# Patient Record
Sex: MALE | Race: WHITE | URBAN - METROPOLITAN AREA
[De-identification: names, ages, dates, MRNs, and addresses within clinical notes are randomized per-mention and may not be internally consistent; named-entity substitution may affect disease eponyms.]

---

## 2018-02-19 ENCOUNTER — TRANSFERRED RECORDS (OUTPATIENT)
Dept: HEALTH INFORMATION MANAGEMENT | Facility: CLINIC | Age: 33
End: 2018-02-19

## 2018-03-28 ENCOUNTER — OFFICE VISIT (OUTPATIENT)
Dept: NEUROLOGY | Facility: CLINIC | Age: 33
End: 2018-03-28
Payer: COMMERCIAL

## 2018-03-28 VITALS
WEIGHT: 188.2 LBS | SYSTOLIC BLOOD PRESSURE: 126 MMHG | HEART RATE: 73 BPM | BODY MASS INDEX: 25.49 KG/M2 | TEMPERATURE: 89.3 F | DIASTOLIC BLOOD PRESSURE: 75 MMHG | HEIGHT: 72 IN

## 2018-03-28 DIAGNOSIS — G40.309 GENERALIZED CONVULSIVE EPILEPSY (H): Primary | ICD-10-CM

## 2018-03-28 RX ORDER — IBUPROFEN 800 MG/1
800 TABLET, FILM COATED ORAL
COMMUNITY
Start: 2018-02-26

## 2018-03-28 RX ORDER — CYCLOBENZAPRINE HCL 10 MG
5-10 TABLET ORAL
COMMUNITY
Start: 2018-02-26 | End: 2019-02-26

## 2018-03-28 RX ORDER — LEVETIRACETAM 500 MG/1
500 TABLET ORAL 2 TIMES DAILY
Qty: 60 TABLET | Refills: 3 | Status: SHIPPED | OUTPATIENT
Start: 2018-03-28 | End: 2018-07-11

## 2018-03-28 RX ORDER — LAMOTRIGINE 25 MG/1
TABLET ORAL
Qty: 250 TABLET | Refills: 3 | Status: SHIPPED | OUTPATIENT
Start: 2018-03-28 | End: 2018-10-10

## 2018-03-28 RX ORDER — LEVETIRACETAM 500 MG/1
500 TABLET ORAL
COMMUNITY
Start: 2018-02-19 | End: 2018-03-28

## 2018-03-28 ASSESSMENT — PAIN SCALES - GENERAL: PAINLEVEL: MODERATE PAIN (5)

## 2018-03-28 NOTE — PROGRESS NOTES
[facility] [place] [facility]    Patient:  Feliberto Perry  :  1985   Age:  33 year old   Today's Office Visit:  3/29/2018    Epilepsy Hisory:   The patient's first seizure occurred when he was 10 years old.  Second seizure at age 27, and third at age 33.     He has had 2 types of seizures:  Type 1:  GTC seizures: His first seizure was a grand mal seizure.  He was told that during GTC seizures, he would have a blank stare and then his eyes would roll back, and he would making noises like grunting sounds.  He would fall to the floor and shake all over, sometimes drool, sometimes bite his tongue.  Post-ictally, he is very tired and have sore muscles and would need to sleep.  He has had these very rarely; the last one was five years ago.  Type 2 are staring spells which subsided around age 16 or 18.  He tapered off carbamazepine in late 20s.  However, Dr. Walden's note states he was off carbamazepine around .  He took Tegretol when he was young, he started taking carbamazepine at age 10. He took carbamazepine from age [age]. He had no side effects from Tegretol, he was tired. He denies myoclonic jerking or other type of seizures.  He states that whenever he had a grand mal seizure, he was usually up late and drinking.  Excessive tiredness also triggers his seizures. He started levetiracetam 2018 and he is taking 500 mg twice a day. He denies history of meningitis, encephalitis, febrile seizures, family history of epilepsy, significant head injury, known tumor or stroke.     Interval History: This is my 1st visit with Feliberto. His wife, Rocio, is with him. He had a seizure on 2018 after a fishing trip, he was sleep deprived and had alcohol. Since starting levetiracetam he has no seizures. Currently, on antiepileptic drugs there is fatigue, no dizziness, no double vision , no mood changes (feelings of depression, irritablity, more argumentative), not sleeping more during the day, no GI  issues  , no rash  or no worsening anxeity . On this visit we spoke about patient's seizures, antiepileptic drug, and plan of epilepsy are. Patient/caregiver was agreeable with plan of care.     MEDICATIONS:  Levetiracetam 500 mg twice a day     Past antiepileptic drug:   He has been off Tegretol since 2014. He has not tried any other AEDs.      ALLERGIES TO MEDICATIONS:  Ancef.      PAST MEDICAL HISTORY:  Epilepsy.  Otherwise unremarkable.      SOCIAL AND EDUCATIONAL HISTORY:  He attended regular classes.  He did not need special education.  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  He is  and has two children.  He used to drink heavily, but has now cut back on his drinking, and maybe drinks once every few months, maximum of two beers.  Denies smoking or illicit drugs.      REVIEW OF SYSTEMS:  Complete review of system was done and was positive for nosebleed and eye floaters.      EXAMINATION /75 (BP Location: Left arm, Patient Position: Chair, Cuff Size: Adult Regular)  Pulse 73  Temp (!) 89.3  F (31.8  C) (Temporal)  Ht 6' (182.9 cm)  Wt 188 lb 3.2 oz (85.4 kg)  BMI 25.52 kg/m2  GENERAL:  Alert and oriented x3.   CARDIOVASCULAR:  Regular rate and rhythm, positive S1, S2.   LUNGS:  Clear to auscultation bilaterally.   ABDOMEN:  Nondistended, nontender.  Normal active bowel sounds.      NEUROLOGICAL EXAMINATION   Mental Status and Higher Cortical Functions:  Alert and oriented to person, place, and time.  Speech fluent, with intact naming and repetition. No dysarthria.  Cranial Nerves (II-XII):  Pupils equal, round, and reactive to light.  Extraocular movements full with no nystagmus.  Visual fields full to confrontation.  Facial sensation intact to light touch, temperature, and pin prick.  Face symmetric at rest and with activation.  Hearing intact to finger rub bilaterally.  Tongue  midline and palate elevation symmetric.  Sternocleidomastoid and trapezius 5/5 bilaterally.   Fundoscopic examination was unremarkable for pallor or edema bilaterally.    Motor:  Normal tone, normal bulk, and no pronator drift.  No tremors or fasciculations. Arm/hand circumduction was symmetric.  Motor strength 5/5 in upper and lower extremities.   Sensation:  Intact to light touch, vibration, and temperature.    Coordination:  Normal finger-nose-finger, fine finger movements, and rapid alternating movements.  No ataxia or dysmetria.     Reflexes:  Deep tendon reflexes 2+ and symmetric throughout.    Gait:  Casual gait and stance normal.         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  He had 3 generalized tonic-clonic convulsion in his life time in the setting of alcohol and sleep deprivation. Last seizure was 2/17/2018. He was started on levetiracetam in ER and he is not able to tolerate the side effects (fatigue). We will transition to lamotrigine. We reviewed the side effects, which include,  but are not limited to mayuri asif rash, hepatotoxicity, leukopenia, mood changes, nausea, cognitive changes, and dizziness.  He was agreeable to starting lamotrigine.     Plan:   Start lamotrigine   Follow up  3 months   Nurse education       Times of Days           Medication Tablet Size Number of Tablets/Capsules Total Daily Dosage   Lamotrigine        7 AM after breakfast  (Morning)   5 PM after dinner  (Night)        Week 1       25 mg  (1 tablet)      0 mg    Continue levetiracetam     Week 2       25 mg   (1 tablet)    25 mg   (1 tablet)   Continue levetiracetam     Week 3      50 mg   (2 tablets)   25 mg   (1 tablet)   Continue levetiracetam     Week 4      50 mg   (2 tablets)   50 mg   (2 tablets)   Continue levetiracetam     Week 5     75 mg   (3 tablets)   50 mg    (2 tablets)   Continue levetiracetam     Week 6      75 mg   (3 tablets)   75 mg   (3 tablets)   Continue levetiracetam    Week  7   100 mg   (4 tablets)  75 mg   (3 tablets)  Continue levetiracetam    Week 8   100 mg   (4 tablets)  100 mg   (4 tablets)  Continue levetiracetam    Week 9   125 mg   (5 tablets)  100 mg   (4 tablets)  Continue levetiracetam    Week 10   125 mg   (5 tablets)  125 mg  (5 tablets)   Continue levetiracetam    Week 11   150 mg   (6 tablets)  125 mg   (5 tablets)  Continue levetiracetam    Week 12   150 mg   (6 tablets)  150 mg   (6 tablets)  Continue levetiracetam      Monitor for side effects, especially rash and mood changes, if any concerns please call our office. [phone]    [place] regulations on driving were reviewed with you and you should not drive until you are three months seizure/spell free.You are prohibited from operating a motor vehicle within 3 months following any seizure or other episode with sudden unconsciousness or inability to sit up, and that it is required to report most recent seizure to the DMV within 30 days after the event.    Avoid any activities that might lead to self-injury or injury of others, within 3 months following any seizure with impaired awareness or impaired motor control such activities include but are not limited to operating power tools, operating firearms, climbing ladders/trees/exposure to heights from which he might fall. Do not operate power tools or heavy machinery and equipment.  Do not swim alone, bathe in any form of tub, such as bathtub, jacuzzi, or hot tub unless there is a responsible adult close by to provide assistance in case she has a seizure and drowns. Avoid work on hot surfaces such stoves, ovens, or with scalding hot water.         I spent 60 minutes with the patient. During this time counseling and coordination of care exceeded 50% of the face to face visit time. I addressed all questions the patient raised in regards to  their medical care.       Amelia Talbot MD

## 2018-03-28 NOTE — PATIENT INSTRUCTIONS
Times of Days           Medication Tablet Size Number of Tablets/Capsules Total Daily Dosage   Lamotrigine        7 AM after breakfast  (Morning)   5 PM after dinner  (Night)        Week 1       25 mg  (1 tablet)      0 mg    Continue levetiracetam     Week 2       25 mg   (1 tablet)    25 mg   (1 tablet)   Continue levetiracetam     Week 3      50 mg   (2 tablets)   25 mg   (1 tablet)   Continue levetiracetam     Week 4      50 mg   (2 tablets)   50 mg   (2 tablets)   Continue levetiracetam     Week 5     75 mg   (3 tablets)   50 mg   (2 tablets)   Continue levetiracetam     Week 6      75 mg   (3 tablets)   75 mg   (3 tablets)   Continue levetiracetam    Week  7   100 mg   (4 tablets)  75 mg   (3 tablets)  Continue levetiracetam    Week 8   100 mg   (4 tablets)  100 mg   (4 tablets)  Continue levetiracetam    Week 9   125 mg   (5 tablets)  100 mg   (4 tablets)  Continue levetiracetam    Week 10   125 mg   (5 tablets)  125 mg  (5 tablets)   Continue levetiracetam    Week 11   150 mg   (6 tablets)  125 mg   (5 tablets)  Continue levetiracetam    Week 12   150 mg   (6 tablets)  150 mg   (6 tablets)  Continue levetiracetam      Monitor for side effects, especially rash and mood changes, if any concerns please call our office. [phone]    [place] regulations on driving were reviewed with you and you should not drive until you are three months seizure/spell free.You are prohibited from operating a motor vehicle within 3 months following any seizure or other episode with sudden unconsciousness or inability to sit up, and that it is required to report most recent seizure to the DMV within 30 days after the event.    Avoid any activities that might lead to self-injury or injury of others, within 3 months following any seizure with impaired awareness or impaired motor control such activities include but are not limited to operating power tools, operating firearms, climbing ladders/trees/exposure to heights from  which he might fall. Do not operate power tools or heavy machinery and equipment.  Do not swim alone, bathe in any form of tub, such as bathtub, jacuzzi, or hot tub unless there is a responsible adult close by to provide assistance in case she has a seizure and drowns. Avoid work on hot surfaces such stoves, ovens, or with scalding hot water.       Amelia Talbot MD

## 2018-03-28 NOTE — LETTER
3/28/2018     RE: Feliberto Perry  : 1985   MRN: [id number]      Dear Colleague,    Thank you for referring your patient, Feliberto Perry, to the [facility] at [facility]. Please see a copy of my visit note below.    [facility] [place] [facility] Progress Note    Patient:  Feliberto Perry  :  1985   Age:  33 year old   Today's Office Visit:  3/29/2018    Epilepsy Hisory:   The patient's first seizure occurred when he was 10 years old.  Second seizure at age 27, and third at age 33.     He has had 2 types of seizures:  Type 1:  GTC seizures: His first seizure was a grand mal seizure.  He was told that during GTC seizures, he would have a blank stare and then his eyes would roll back, and he would making noises like grunting sounds.  He would fall to the floor and shake all over, sometimes drool, sometimes bite his tongue.  Post-ictally, he is very tired and have sore muscles and would need to sleep.  He has had these very rarely; the last one was five years ago.  Type 2 are staring spells which subsided around age 16 or 18.  He tapered off carbamazepine in late 20s.  However, Dr. Walden's note states he was off carbamazepine around .  He took Tegretol when he was young, he started taking carbamazepine at age 10. He took carbamazepine from age [age]. He had no side effects from Tegretol, he was tired. He denies myoclonic jerking or other type of seizures.  He states that whenever he had a grand mal seizure, he was usually up late and drinking.  Excessive tiredness also triggers his seizures. He started levetiracetam 2018 and he is taking 500 mg twice a day. He denies history of meningitis, encephalitis, febrile seizures, family history of epilepsy, significant head injury, known tumor or stroke.     Interval History: This is my 1st visit with Feliberto. His wife, Rocio, is with him. He had a seizure on 2018 after a fishing trip, he was sleep  deprived and had alcohol. Since starting levetiracetam he has no seizures. Currently, on antiepileptic drugs there is fatigue, no dizziness, no double vision , no mood changes (feelings of depression, irritablity, more argumentative), not sleeping more during the day, no GI issues  , no rash  or no worsening anxeity . On this visit we spoke about patient's seizures, antiepileptic drug, and plan of epilepsy are. Patient/caregiver was agreeable with plan of care.     MEDICATIONS:  Levetiracetam 500 mg twice a day     Past antiepileptic drug:   He has been off Tegretol since 2014. He has not tried any other AEDs.      ALLERGIES TO MEDICATIONS:  Ancef.      PAST MEDICAL HISTORY:  Epilepsy.  Otherwise unremarkable.      SOCIAL AND EDUCATIONAL HISTORY:  He attended regular classes.  He did not need special education.  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  He is  and has two children.  He used to drink heavily, but has now cut back on his drinking, and maybe drinks once every few months, maximum of two beers.  Denies smoking or illicit drugs.      REVIEW OF SYSTEMS:  Complete review of system was done and was positive for nosebleed and eye floaters.      EXAMINATION /75 (BP Location: Left arm, Patient Position: Chair, Cuff Size: Adult Regular)  Pulse 73  Temp (!) 89.3  F (31.8  C) (Temporal)  Ht 6' (182.9 cm)  Wt 188 lb 3.2 oz (85.4 kg)  BMI 25.52 kg/m2  GENERAL:  Alert and oriented x3.   CARDIOVASCULAR:  Regular rate and rhythm, positive S1, S2.   LUNGS:  Clear to auscultation bilaterally.   ABDOMEN:  Nondistended, nontender.  Normal active bowel sounds.      NEUROLOGICAL EXAMINATION   Mental Status and Higher Cortical Functions:  Alert and oriented to person, place, and time.  Speech fluent, with intact naming and repetition. No dysarthria.  Cranial Nerves (II-XII):  Pupils equal, round,  and reactive to light.  Extraocular movements full with no nystagmus.  Visual fields full to confrontation.  Facial sensation intact to light touch, temperature, and pin prick.  Face symmetric at rest and with activation.  Hearing intact to finger rub bilaterally.  Tongue midline and palate elevation symmetric.  Sternocleidomastoid and trapezius 5/5 bilaterally.   Fundoscopic examination was unremarkable for pallor or edema bilaterally.    Motor:  Normal tone, normal bulk, and no pronator drift.  No tremors or fasciculations. Arm/hand circumduction was symmetric.  Motor strength 5/5 in upper and lower extremities.   Sensation:  Intact to light touch, vibration, and temperature.    Coordination:  Normal finger-nose-finger, fine finger movements, and rapid alternating movements.  No ataxia or dysmetria.     Reflexes:  Deep tendon reflexes 2+ and symmetric throughout.    Gait:  Casual gait and stance normal.         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  He had 3 generalized tonic-clonic convulsion in his life time in the setting of alcohol and sleep deprivation. Last seizure was 2/17/2018. He was started on levetiracetam in ER and he is not able to tolerate the side effects (fatigue). We will transition to lamotrigine. We reviewed the side effects, which include,  but are not limited to mayuri asif rash, hepatotoxicity, leukopenia, mood changes, nausea, cognitive changes, and dizziness.  He was agreeable to starting lamotrigine.     Plan:   Start lamotrigine   Follow up  3 months   Nurse education           Times of Days           Medication Tablet Size Number of Tablets/Capsules Total Daily Dosage   Lamotrigine        7 AM after breakfast  (Morning)   5 PM after dinner  (Night)        Week 1       25 mg  (1 tablet)      0 mg    Continue levetiracetam     Week 2        25 mg   (1 tablet)    25 mg   (1 tablet)   Continue levetiracetam     Week 3      50 mg   (2 tablets)   25 mg   (1 tablet)   Continue levetiracetam     Week 4      50 mg   (2 tablets)   50 mg   (2 tablets)   Continue levetiracetam     Week 5     75 mg   (3 tablets)   50 mg   (2 tablets)   Continue levetiracetam     Week 6      75 mg   (3 tablets)   75 mg   (3 tablets)   Continue levetiracetam    Week  7   100 mg   (4 tablets)  75 mg   (3 tablets)  Continue levetiracetam    Week 8   100 mg   (4 tablets)  100 mg   (4 tablets)  Continue levetiracetam    Week 9   125 mg   (5 tablets)  100 mg   (4 tablets)  Continue levetiracetam    Week 10   125 mg   (5 tablets)  125 mg  (5 tablets)   Continue levetiracetam    Week 11   150 mg   (6 tablets)  125 mg   (5 tablets)  Continue levetiracetam    Week 12   150 mg   (6 tablets)  150 mg   (6 tablets)  Continue levetiracetam      Monitor for side effects, especially rash and mood changes, if any concerns please call our office. [phone]    [place] regulations on driving were reviewed with you and you should not drive until you are three months seizure/spell free.You are prohibited from operating a motor vehicle within 3 months following any seizure or other episode with sudden unconsciousness or inability to sit up, and that it is required to report most recent seizure to the DMV within 30 days after the event.    Avoid any activities that might lead to self-injury or injury of others, within 3 months following any seizure with impaired awareness or impaired motor control such activities include but are not limited to operating power tools, operating firearms, climbing ladders/trees/exposure to heights from which he might fall. Do not operate power tools or heavy machinery and equipment.  Do not swim alone, bathe in any form of tub, such as bathtub, jacuzzi, or hot tub unless there is a responsible adult close by to provide assistance in case she has a seizure and drowns.  Avoid work on hot surfaces such stoves, ovens, or with scalding hot water.     I spent 60 minutes with the patient. During this time counseling and coordination of care exceeded 50% of the face to face visit time. I addressed all questions the patient raised in regards to their medical care.     Again, thank you for allowing me to participate in the care of your patient.      Sincerely,    Amelia Talbot MD

## 2018-03-28 NOTE — MR AVS SNAPSHOT
After Visit Summary   3/28/2018    Feliberto Perry    MRN: [id number]           Patient Information     Date Of Birth          1985        Visit Information        Provider Department      3/28/2018 1:00 PM Amelia Talbot MD [facility]        Today's Diagnoses     Generalized convulsive epilepsy (H)    -  1      Care Instructions      Times of Days           Medication Tablet Size Number of Tablets/Capsules Total Daily Dosage   Lamotrigine        7 AM after breakfast  (Morning)   5 PM after dinner  (Night)        Week 1       25 mg  (1 tablet)      0 mg    Continue levetiracetam     Week 2       25 mg   (1 tablet)    25 mg   (1 tablet)   Continue levetiracetam     Week 3      50 mg   (2 tablets)   25 mg   (1 tablet)   Continue levetiracetam     Week 4      50 mg   (2 tablets)   50 mg   (2 tablets)   Continue levetiracetam     Week 5     75 mg   (3 tablets)   50 mg   (2 tablets)   Continue levetiracetam     Week 6      75 mg   (3 tablets)   75 mg   (3 tablets)   Continue levetiracetam    Week  7   100 mg   (4 tablets)  75 mg   (3 tablets)  Continue levetiracetam    Week 8   100 mg   (4 tablets)  100 mg   (4 tablets)  Continue levetiracetam    Week 9   125 mg   (5 tablets)  100 mg   (4 tablets)  Continue levetiracetam    Week 10   125 mg   (5 tablets)  125 mg  (5 tablets)   Continue levetiracetam    Week 11   150 mg   (6 tablets)  125 mg   (5 tablets)  Continue levetiracetam    Week 12   150 mg   (6 tablets)  150 mg   (6 tablets)  Continue levetiracetam      Monitor for side effects, especially rash and mood changes, if any concerns please call our office. [phone]    [place] regulations on driving were reviewed with you and you should not drive until you are three months seizure/spell free.You are prohibited from operating a motor vehicle within 3 months following any seizure or other episode with sudden unconsciousness or inability to sit up, and that it is required to report most  recent seizure to the DMV within 30 days after the event.    Avoid any activities that might lead to self-injury or injury of others, within 3 months following any seizure with impaired awareness or impaired motor control such activities include but are not limited to operating power tools, operating firearms, climbing ladders/trees/exposure to heights from which he might fall. Do not operate power tools or heavy machinery and equipment.  Do not swim alone, bathe in any form of tub, such as bathtub, jacuzzi, or hot tub unless there is a responsible adult close by to provide assistance in case she has a seizure and drowns. Avoid work on hot surfaces such stoves, ovens, or with scalding hot water.       Amelia Talbot MD             Follow-ups after your visit        Follow-up notes from your care team     Return in about 3 months (around 6/28/2018).      Your next 10 appointments already scheduled     Jul 11, 2018  3:00 PM CDT   Return Visit with Amelia Tablot MD   [facility]    [address] [place] [address], [address]  [facility] [address] [phone]              Who to contact     Please call your clinic at [phone] to:    Ask questions about your health    Make or cancel appointments    Discuss your medicines    Learn about your test results    Speak to your doctor            Additional Information About Your Visit        MyChart Information     [employer] is an electronic gateway that provides easy, online access to your medical records. With Higher[employer], you can request a clinic appointment, read your test results, renew a prescription or communicate with your care team.     To sign up for [employer] visit the website at [email or web address]   You will be asked to enter the access code listed below, as well as some personal information. Please follow the directions to create your username and password.     Your access code is: [email or web address]  Expires: 6/26/2018  2:12 PM      Your access code will  in 90 days. If you need help or a new code, please contact your [facility] or call [phone] for assistance.        Care EveryWhere ID     This is your Care EveryWhere ID. This could be used by other organizations to access your [place] medical records  [id number]        Your Vitals Were     Pulse Temperature Height BMI (Body Mass Index)          73 89.3  F (31.8  C) (Temporal) 6' (182.9 cm) 25.52 kg/m2         Blood Pressure from Last 3 Encounters:   18 126/75   16 101/65    Weight from Last 3 Encounters:   18 188 lb 3.2 oz (85.4 kg)   16 180 lb (81.6 kg)              We Performed the Following     Comprehensive metabolic panel     Lamotrigine Level          Today's Medication Changes          These changes are accurate as of 3/28/18  2:12 PM.  If you have any questions, ask your nurse or doctor.               Start taking these medicines.        Dose/Directions    lamoTRIgine 25 MG tablet   Commonly known as:  LaMICtal   Used for:  Generalized convulsive epilepsy (H)   Started by:  Amelia Talbot MD        Titrate to 150 mg twice a day   Quantity:  250 tablet   Refills:  3         These medicines have changed or have updated prescriptions.        Dose/Directions    levETIRAcetam 500 MG tablet   Commonly known as:  KEPPRA   This may have changed:  when to take this   Used for:  Generalized convulsive epilepsy (H)   Changed by:  Amelia Talbot MD        Dose:  500 mg   Take 1 tablet (500 mg) by mouth 2 times daily   Quantity:  60 tablet   Refills:  3            Where to get your medicines      These medications were sent to [id number] - [place], [place] - [address]  [address] [address]     Phone:  [phone]     lamoTRIgine 25 MG tablet    levETIRAcetam 500 MG tablet                Primary Care Provider Office Phone # Fax #    Kevin Walden [phone]       [facility]  [address]  [place] [address]        Equal Access to Services     RICARDOHEATHER JAMES : Hadii john jacques louiekaushal Sotrudyali, waaxda luqadaha, qaybta kaedisonda dankdavina, mela johnson diazkylee weemssylvester fuentes ellen arevalo. So [facility] [phone].    ATENCIÓN: Si habla español, tiene a narayan disposición servicios gratuitos de asistencia lingüística. Llame al [phone].    We comply with applicable federal civil rights laws and [place] laws. We do not discriminate on the basis of race, color, national origin, age, disability, sex, sexual orientation, or gender identity.            Thank you!     Thank you for choosing [facility]  for your care. Our goal is always to provide you with excellent care. Hearing back from our patients is one way we can continue to improve our services. Please take a few minutes to complete the written survey that you may receive in the mail after your visit with us. Thank you!             Your Updated Medication List - Protect others around you: Learn how to safely use, store and throw away your medicines at www.disposemymeds.org.          This list is accurate as of 3/28/18  2:12 PM.  Always use your most recent med list.                   Brand Name Dispense Instructions for use Diagnosis    cyclobenzaprine 10 MG tablet    FLEXERIL     Take 5-10 mg by mouth    Generalized convulsive epilepsy (H)       ibuprofen 800 MG tablet    ADVIL/MOTRIN     Take 800 mg by mouth    Generalized convulsive epilepsy (H)       lamoTRIgine 25 MG tablet    LaMICtal    250 tablet    Titrate to 150 mg twice a day    Generalized convulsive epilepsy (H)       levETIRAcetam 500 MG tablet    KEPPRA    60 tablet    Take 1 tablet (500 mg) by mouth 2 times daily    Generalized convulsive epilepsy (H)

## 2018-06-25 ENCOUNTER — TRANSFERRED RECORDS (OUTPATIENT)
Dept: HEALTH INFORMATION MANAGEMENT | Facility: CLINIC | Age: 33
End: 2018-06-25

## 2018-06-27 LAB
ALBUMIN SERPL-MCNC: 4.2 G/DL (ref 3.5–5)
ALP SERPL-CCNC: 65 U/L (ref 40–150)
ALT SERPL-CCNC: 18 U/L (ref 0–55)
ANION GAP SERPL CALCULATED.3IONS-SCNC: 10.7 MMOL/L
AST SERPL-CCNC: 23 U/L (ref 5–34)
BILIRUB SERPL-MCNC: 0.7 MG/DL (ref 0.2–1.2)
BUN SERPL-MCNC: 11 MG/DL (ref 7–26)
CALCIUM SERPL-MCNC: 9.8 MG/DL (ref 8.4–10.2)
CHLORIDE SERPLBLD-SCNC: 106 MMOL/L (ref 98–107)
CO2 SERPL-SCNC: 30 MMOL/L (ref 20–31)
CREAT SERPL-MCNC: 1 MG/DL (ref 0.73–1.18)
GFR SERPL CREATININE-BSD FRML MDRD: 85 ML/MIN/1.73M2
GLUCOSE SERPL-MCNC: 85 MG/DL (ref 70–105)
LAMOTRIGINE SERPL-MCNC: 4.5 UG/ML (ref 2.5–15)
OSMOLALITY: 293 MMOL/KG (ref 275–295)
POTASSIUM SERPL-SCNC: 4.7 MMOL/L (ref 3.5–5.1)
PROT SERPL-MCNC: 7.1 G/DL (ref 6.4–8.3)
SODIUM SERPL-SCNC: 142 MMOL/L (ref 136–145)

## 2018-06-28 DIAGNOSIS — G40.309 GENERALIZED CONVULSIVE EPILEPSY (H): ICD-10-CM

## 2018-07-11 ENCOUNTER — OFFICE VISIT (OUTPATIENT)
Dept: NEUROLOGY | Facility: CLINIC | Age: 33
End: 2018-07-11
Payer: COMMERCIAL

## 2018-07-11 VITALS
RESPIRATION RATE: 16 BRPM | BODY MASS INDEX: 24.28 KG/M2 | HEART RATE: 82 BPM | SYSTOLIC BLOOD PRESSURE: 115 MMHG | WEIGHT: 179 LBS | TEMPERATURE: 97.4 F | DIASTOLIC BLOOD PRESSURE: 65 MMHG

## 2018-07-11 DIAGNOSIS — G40.309 GENERALIZED CONVULSIVE EPILEPSY (H): ICD-10-CM

## 2018-07-11 RX ORDER — LEVETIRACETAM 250 MG/1
TABLET ORAL
Qty: 360 TABLET | Refills: 3 | Status: SHIPPED | OUTPATIENT
Start: 2018-07-11 | End: 2018-10-10

## 2018-07-11 RX ORDER — LAMOTRIGINE 100 MG/1
TABLET ORAL
Qty: 405 TABLET | Refills: 3 | Status: SHIPPED | OUTPATIENT
Start: 2018-07-11 | End: 2018-10-10

## 2018-07-11 ASSESSMENT — PAIN SCALES - GENERAL: PAINLEVEL: NO PAIN (0)

## 2018-07-11 NOTE — PROGRESS NOTES
[facility]    Patient:  Feliberto Perry  :  1985   Age:  33 year old   Today's Office Visit:  2018    Epilepsy Data:  The patient's first seizure occurred when he was 10 years old.  Second seizure at age 27, and third at age 33. He had a 4th seizure 2018, he missed a one antiepileptic drug dose. The had his 5th seizure 2018,    He has had 2 types of seizures:  Type 1:  GTC seizures: His first seizure was a grand mal seizure.  He was told that during GTC seizures, he would have a blank stare and then his eyes would roll back, and he would making noises like grunting sounds.  He would fall to the floor and shake all over, sometimes drool, sometimes bite his tongue.  Post-ictally, he is very tired and have sore muscles and would need to sleep.  He has had these very rarely; the last one was five years ago.  Type 2 are staring spells which subsided around age 16 or 18.  He tapered off carbamazepine in late 20s.  However, Dr. Walden's note states he was off carbamazepine around .  He took Tegretol when he was young, he started taking carbamazepine at age 10. He took carbamazepine from age [age]. He had no side effects from Tegretol, he was tired. He denies myoclonic jerking or other type of seizures.  He states that whenever he had a grand mal seizure, he was usually up late and drinking.  Excessive tiredness also triggers his seizures. He started levetiracetam 2018 and he is taking 500 mg twice a day. He denies history of meningitis, encephalitis, febrile seizures, family history of epilepsy, significant head injury, known tumor or stroke.     Interval History: This is my 2nd visit with Feliberto. His wife, Rocio, is with him. He had a seizure 2018, he missed a one antiepileptic drug dose. The had his 5th seizure 2018, he did not miss antiepileptic drug, no alcohol. On lamotrigine no major side effects. Most seizure happen at 8:30am. Its not clear why  seizure have increased. He does not have MRI brain on file, we will order one.  Currently, on antiepileptic drugs there is fatigue, no dizziness, no double vision , no mood changes (feelings of depression, irritablity, more argumentative), not sleeping more during the day, no GI issues  , no rash  or no worsening anxeity . On this visit we spoke about patient's seizures, antiepileptic drug, and plan of epilepsy are. Patient/caregiver was agreeable with plan of care.     Prior to Admission medications    Medication Sig Start Date End Date Taking? Authorizing Provider   lamoTRIgine (LAMICTAL) 100 MG tablet Titrate to 350 mg am and 150 mg pm 7/11/18  Yes Amelia Talbot MD   lamoTRIgine (LAMICTAL) 25 MG tablet Titrate to 150 mg twice a day 3/28/18  Yes Amelia Talbot MD   levETIRAcetam (KEPPRA) 250 MG tablet 500 mg twice a day, then change to 250 mg am and 750 mg pm 7/11/18  Yes Amelia Talbot MD   cyclobenzaprine (FLEXERIL) 10 MG tablet Take 5-10 mg by mouth 2/26/18 2/26/19  Reported, Patient   ibuprofen (ADVIL/MOTRIN) 800 MG tablet Take 800 mg by mouth 2/26/18   Reported, Patient         MEDICATIONS:  Levetiracetam 500 mg twice a day and lamotrigine 150 mg twice a day     Past antiepileptic drug:   He has been off Tegretol since 2014. He has not tried any other AEDs.      ALLERGIES TO MEDICATIONS:  Ancef.      PAST MEDICAL HISTORY:  Epilepsy.  Otherwise unremarkable.      SOCIAL AND EDUCATIONAL HISTORY:  He attended regular classes.  He did not need special education.  He had [employer] education courses in construction/labor. 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  He is  and has two children.  He used to drink heavily, but has now cut back on his drinking, and maybe drinks once every few months, maximum of two beers.  Denies smoking or illicit drugs.      REVIEW OF SYSTEMS:  Complete review of system was done and was positive for nosebleed and eye  floaters.      EXAMINATION /65 (BP Location: Right arm, Patient Position: Chair, Cuff Size: Adult Regular)  Pulse 82  Temp 97.4  F (36.3  C)  Resp 16  Wt 179 lb (81.2 kg)  BMI 24.28 kg/m2  Alert, orientated, speech is fluent, pupils are equal, round, and reactive to light, face symmetric, no pronator drip, equal  strength, reflexes are symmetric, normal to light touch with no sensory deficits noted, finger to nose normal, no focal deficits noted.Gait is stable. Able to tandem gait.      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  He had 5 generalized tonic-clonic convulsions, last seizure was June 2018 on levetiracetam 500-500 and lamotrigine 150-150. Lamotrigine level was 4.7. We will optimize lamotrigine and shift levetiracetam to evening reduce fatigue side effects in the morning. We reviewed the side effects, which include,  but are not limited to mayuri asif rash, hepatotoxicity, leukopenia, mood changes, nausea, cognitive changes, and dizziness.  He was agreeable to increasing lamotrigine.     Other antiepileptic drug to consider are: depakote, zonisamide, topiramate, vimpat, fycompa.     Plan:   Follow up  2 months   Nurse education                Medication Name   Tablet Size         8AM  (morning)   8PM (Night)   Notes    Week 1   lamotrigine 100 mg  1.5 tablet    1.5 tablet         lamotrigine 25 mg   1 tablet    0 tablet         levetiracetam 250 mg  2 tablet    2 tablet    levetiracetam pill size changed                  Medication Name   Tablet Size         8AM  (morning)   8PM (Night)   Notes    Week 2   lamotrigine 100 mg  2 tablet    1.5 tablet         lamotrigine 25 mg   0 tablet    0 tablet         levetiracetam 250 mg  2 tablet    2 tablet                     Medication Name   Tablet Size         8AM  (morning)   8PM (Night)   Notes     Week 3-4   lamotrigine 100 mg  2.5 tablet    1.5 tablet         lamotrigine 25 mg   0 tablet    0 tablet         levetiracetam 250 mg  2 tablet    2 tablet                     Medication Name   Tablet Size         8 AM  (morning)   8 PM (Night)   Notes    Week 5--6   lamotrigine 100 mg  3.5 tablet    1 tablet   check antiepileptic drug levels       lamotrigine 25 mg   0 tablet    0 tablet         levetiracetam 250 mg  2 tablet    2 tablet                   Medication Name   Tablet Size         8 AM  (morning)   8 PM (Night)   Notes    Week 7-onward   lamotrigine 100 mg  3.5 tablet    1 tablet   check antiepileptic drug levels       lamotrigine 25 mg   0 tablet    0 tablet         levetiracetam 250 mg  1 tablet    3 tablet  Levetiracetam pill change             [place] regulations on driving were reviewed with you and you should not drive until you are three months seizure/spell free.You are prohibited from operating a motor vehicle within 3 months following any seizure or other episode with sudden unconsciousness or inability to sit up, and that it is required to report most recent seizure to the DMV within 30 days after the event.    Avoid any activities that might lead to self-injury or injury of others, within 3 months following any seizure with impaired awareness or impaired motor control such activities include but are not limited to operating power tools, operating firearms, climbing ladders/trees/exposure to heights from which he might fall. Do not operate power tools or heavy machinery and equipment.  Do not swim alone, bathe in any form of tub, such as bathtub, jacuzzi, or hot tub unless there is a responsible adult close by to provide assistance in case she has a seizure and drowns. Avoid work on hot surfaces such stoves, ovens, or with scalding hot water.         I spent 40 minutes with the patient. During this time counseling and coordination of care exceeded 50% of the face to face visit time. I  addressed all questions the patient raised in regards to their medical care.       Amelia Talbot MD

## 2018-07-11 NOTE — MR AVS SNAPSHOT
After Visit Summary   7/11/2018    Feliberto Perry    MRN: [id number]           Patient Information     Date Of Birth          1985        Visit Information        Provider Department      7/11/2018 3:00 PM Amelia Talbot MD [facility]        Today's Diagnoses     Generalized convulsive epilepsy (H)          Care Instructions                   Medication Name   Tablet Size         8AM  (morning)   8PM (Night)   Notes    Week 1   lamotrigine 100 mg  1.5 tablet    1.5 tablet         lamotrigine 25 mg   1 tablet    0 tablet         levetiracetam 250 mg  2 tablet    2 tablet    levetiracetam pill size changed                  Medication Name   Tablet Size         8AM  (morning)   8PM (Night)   Notes    Week 2   lamotrigine 100 mg  2 tablet    1.5 tablet         lamotrigine 25 mg   0 tablet    0 tablet         levetiracetam 250 mg  2 tablet    2 tablet                     Medication Name   Tablet Size         8AM  (morning)   8PM (Night)   Notes    Week 3-4   lamotrigine 100 mg  2.5 tablet    1.5 tablet         lamotrigine 25 mg   0 tablet    0 tablet         levetiracetam 250 mg  2 tablet    2 tablet                     Medication Name   Tablet Size         8 AM  (morning)   8 PM (Night)   Notes    Week 5--6   lamotrigine 100 mg  3.5 tablet    1 tablet   check antiepileptic drug levels       lamotrigine 25 mg   0 tablet    0 tablet         levetiracetam 250 mg  2 tablet    2 tablet                   Medication Name   Tablet Size         8 AM  (morning)   8 PM (Night)   Notes    Week 7-onward   lamotrigine 100 mg  3.5 tablet    1 tablet   check antiepileptic drug levels       lamotrigine 25 mg   0 tablet    0 tablet         levetiracetam 250 mg  1 tablet    3 tablet  Levetiracetam pill change           Take antiepileptic drug after breakfast to reduce side effects.  If lamotrigine 350 mg am is too much and causes side effects, then change to 300 mg am and 150 mg pm    MRI brain   Nurse  education on generalized tonic-clonic convulsion   Follow up  In 6-12 weeks  [place] regulations on driving were reviewed with you and you should not drive until you are three months seizure/spell free.You are prohibited from operating a motor vehicle within 3 months following any seizure or other episode with sudden unconsciousness or inability to sit up, and that it is required to report most recent seizure to the DMV within 30 days after the event.    Avoid any activities that might lead to self-injury or injury of others, within 3 months following any seizure with impaired awareness or impaired motor control such activities include but are not limited to operating power tools, operating firearms, climbing ladders/trees/exposure to heights from which he might fall. Do not operate power tools or heavy machinery and equipment.  Do not swim alone, bathe in any form of tub, such as bathtub, jacuzzi, or hot tub unless there is a responsible adult close by to provide assistance in case she has a seizure and drowns. Avoid work on hot surfaces such stoves, ovens, or with scalding hot water.         CONTINUE TAKING YOUR OTHER MEDICATIONS AS PREVIOUSLY DIRECTED.  IF YOU  HAVE ANY SIDE EFFECTS OR CONCERNS ABOUT YOUR ANTIEPILEPTIC DRUG CALL [facility] OFFICE [phone]. PLEASE FOLLOW MEDICATION CHANGES AS ADVISED.     This titration schedule was revive wed with patient or caregiver. They expressed understanding of these antiepileptic drug changes.     HALINA DOYLE MD               Follow-ups after your visit        Additional Services     [facility] Patient Education Referral                 Follow-up notes from your care team     Return in about 3 months (around 10/11/2018).      Your next 10 appointments already scheduled     Jul 25, 2018  3:30 PM CDT   Telephone Call with [facility] Nurse 1   [facility] ([facility]    [address], [address]  [facility] [id number] [phone]            Note: this is not an onsite visit; there is no need to come to the facility.            Oct 10, 2018  3:00 PM CDT   Return Visit with Amelia Talbot MD   [facility] ([facility]    [address], [address]  [facility] [id number] [phone]              Future tests that were ordered for you today     Open Standing Orders        Priority Remaining Interval Expires Ordered    Lamotrigine Level Routine 4/4 1/11/2019 7/11/2018    Keppra (Levetiracetam) Level Routine 4/4 1/11/2019 7/11/2018          Open Future Orders        Priority Expected Expires Ordered    Comprehensive metabolic panel Routine 8/11/2018 12/11/2018 7/11/2018    MR Brain w/o & w Contrast Routine  7/11/2019 7/11/2018            Who to contact     Please call your clinic at [phone] to:    Ask questions about your health    Make or cancel appointments    Discuss your medicines    Learn about your test results    Speak to your doctor            Additional Information About Your Visit        Care EveryWhere ID     This is your Care EveryWhere ID. This could be used by other organizations to access your [place] medical records  [id number]        Your Vitals Were     Pulse Temperature Respirations BMI (Body Mass Index)          82 97.4  F (36.3  C) 16 24.28 kg/m2         Blood Pressure from Last 3 Encounters:   07/11/18 115/65   03/28/18 126/75   05/26/16 101/65    Weight from Last 3 Encounters:   07/11/18 179 lb (81.2 kg)   03/28/18 188 lb 3.2 oz (85.4 kg)   05/26/16 180 lb (81.6 kg)              We Performed the Following     [facility] Patient Education Referral          Today's Medication Changes          These changes are accurate as of 7/11/18  3:27 PM.  If you have any questions, ask your nurse or doctor.               These medicines have changed or have updated prescriptions.        Dose/Directions    * lamoTRIgine 25 MG tablet   Commonly known as:  LaMICtal   This may have changed:  Another medication with  the same name was added. Make sure you understand how and when to take each.   Used for:  Generalized convulsive epilepsy (H)   Changed by:  Amelia Talbot MD        Titrate to 150 mg twice a day   Quantity:  250 tablet   Refills:  3       * lamoTRIgine 100 MG tablet   Commonly known as:  LaMICtal   This may have changed:  You were already taking a medication with the same name, and this prescription was added. Make sure you understand how and when to take each.   Used for:  Generalized convulsive epilepsy (H)   Changed by:  Amelia Talbot MD        Titrate to 350 mg am and 150 mg pm   Quantity:  405 tablet   Refills:  3       levETIRAcetam 250 MG tablet   Commonly known as:  KEPPRA   This may have changed:    - medication strength  - how much to take  - how to take this  - when to take this  - additional instructions   Used for:  Generalized convulsive epilepsy (H)   Changed by:  Amelia Talbot MD        500 mg twice a day, then change to 250 mg am and 750 mg pm   Quantity:  360 tablet   Refills:  3       * Notice:  This list has 2 medication(s) that are the same as other medications prescribed for you. Read the directions carefully, and ask your doctor or other care provider to review them with you.         Where to get your medicines      These medications were sent to [id number] - [place] - [address]  [address] [facility] [address]     Phone:  [phone]     lamoTRIgine 100 MG tablet    levETIRAcetam 250 MG tablet                Primary Care Provider Office Phone # Fax #    Kevin Walden [phone]       [facility] [address]  [place] [address]        Equal Access to Services     [facility] AH: Hadii john jacques hadasho Soomaali, waaxda luqadaha, qaybta kaalmada adedavina, mela arevalo. So [facility] [place] [phone].    ATENCIÓN: Si habla español, tiene a narayan disposición servicios gratuitos de asistencia lingüística. Llame al  [phone].    We comply with applicable federal civil rights laws and [place] laws. We do not discriminate on the basis of race, color, national origin, age, disability, sex, sexual orientation, or gender identity.            Thank you!     Thank you for choosing [facility]  for your care. Our goal is always to provide you with excellent care. Hearing back from our patients is one way we can continue to improve our services. Please take a few minutes to complete the written survey that you may receive in the mail after your visit with us. Thank you!             Your Updated Medication List - Protect others around you: Learn how to safely use, store and throw away your medicines at www.disposemymeds.org.          This list is accurate as of 7/11/18  3:27 PM.  Always use your most recent med list.                   Brand Name Dispense Instructions for use Diagnosis    cyclobenzaprine 10 MG tablet    FLEXERIL     Take 5-10 mg by mouth    Generalized convulsive epilepsy (H)       ibuprofen 800 MG tablet    ADVIL/MOTRIN     Take 800 mg by mouth    Generalized convulsive epilepsy (H)       * lamoTRIgine 25 MG tablet    LaMICtal    250 tablet    Titrate to 150 mg twice a day    Generalized convulsive epilepsy (H)       * lamoTRIgine 100 MG tablet    LaMICtal    405 tablet    Titrate to 350 mg am and 150 mg pm    Generalized convulsive epilepsy (H)       levETIRAcetam 250 MG tablet    KEPPRA    360 tablet    500 mg twice a day, then change to 250 mg am and 750 mg pm    Generalized convulsive epilepsy (H)       * Notice:  This list has 2 medication(s) that are the same as other medications prescribed for you. Read the directions carefully, and ask your doctor or other care provider to review them with you.

## 2018-07-11 NOTE — LETTER
2018       RE: Feliberto Perry  : 1985   MRN: [id number]      Dear Colleague,    Thank you for referring your patient, Feliberto Perry, to the [facility]PILEPS[facility]ARE at [facility]. Please see a copy of my visit note below.    [facility]    Patient:  Feliberto Perry  :  1985   Age:  33 year old   Today's Office Visit:  2018    Epilepsy Data:  The patient's first seizure occurred when he was 10 years old.  Second seizure at age 27, and third at age 33. He had a 4th seizure 2018, he missed a one antiepileptic drug dose. The had his 5th seizure 2018,    He has had 2 types of seizures:  Type 1:  GTC seizures: His first seizure was a grand mal seizure.  He was told that during GTC seizures, he would have a blank stare and then his eyes would roll back, and he would making noises like grunting sounds.  He would fall to the floor and shake all over, sometimes drool, sometimes bite his tongue.  Post-ictally, he is very tired and have sore muscles and would need to sleep.  He has had these very rarely; the last one was five years ago.  Type 2 are staring spells which subsided around age 16 or 18.  He tapered off carbamazepine in late 20s.  However, Dr. Walden's note states he was off carbamazepine around .  He took Tegretol when he was young, he started taking carbamazepine at age 10. He took carbamazepine from age [age]. He had no side effects from Tegretol, he was tired. He denies myoclonic jerking or other type of seizures.  He states that whenever he had a grand mal seizure, he was usually up late and drinking.  Excessive tiredness also triggers his seizures. He started levetiracetam 2018 and he is taking 500 mg twice a day. He denies history of meningitis, encephalitis, febrile seizures, family history of epilepsy, significant head injury, known tumor or stroke.     Interval History: This is my 2nd visit with  Feliberto. His wife, Rocio, is with him. He had a seizure 4/2018, he missed a one antiepileptic drug dose. The had his 5th seizure June 2018, he did not miss antiepileptic drug, no alcohol. On lamotrigine no major side effects. Most seizure happen at 8:30am. Its not clear why seizure have increased. He does not have MRI brain on file, we will order one.  Currently, on antiepileptic drugs there is fatigue, no dizziness, no double vision , no mood changes (feelings of depression, irritablity, more argumentative), not sleeping more during the day, no GI issues  , no rash  or no worsening anxeity . On this visit we spoke about patient's seizures, antiepileptic drug, and plan of epilepsy are. Patient/caregiver was agreeable with plan of care.     Prior to Admission medications    Medication Sig Start Date End Date Taking? Authorizing Provider   lamoTRIgine (LAMICTAL) 100 MG tablet Titrate to 350 mg am and 150 mg pm 7/11/18  Yes Amelia Talbot MD   lamoTRIgine (LAMICTAL) 25 MG tablet Titrate to 150 mg twice a day 3/28/18  Yes Amelia Talbot MD   levETIRAcetam (KEPPRA) 250 MG tablet 500 mg twice a day, then change to 250 mg am and 750 mg pm 7/11/18  Yes Amelia Talbot MD   cyclobenzaprine (FLEXERIL) 10 MG tablet Take 5-10 mg by mouth 2/26/18 2/26/19  Reported, Patient   ibuprofen (ADVIL/MOTRIN) 800 MG tablet Take 800 mg by mouth 2/26/18   Reported, Patient         MEDICATIONS:  Levetiracetam 500 mg twice a day and lamotrigine 150 mg twice a day     Past antiepileptic drug:   He has been off Tegretol since 2014. He has not tried any other AEDs.      ALLERGIES TO MEDICATIONS:  Ancef.      PAST MEDICAL HISTORY:  Epilepsy.  Otherwise unremarkable.      SOCIAL AND EDUCATIONAL HISTORY:  He attended regular classes.  He did not need special education.  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  He is  and has two  children.  He used to drink heavily, but has now cut back on his drinking, and maybe drinks once every few months, maximum of two beers.  Denies smoking or illicit drugs.      REVIEW OF SYSTEMS:  Complete review of system was done and was positive for nosebleed and eye floaters.      EXAMINATION /65 (BP Location: Right arm, Patient Position: Chair, Cuff Size: Adult Regular)  Pulse 82  Temp 97.4  F (36.3  C)  Resp 16  Wt 179 lb (81.2 kg)  BMI 24.28 kg/m2  Alert, orientated, speech is fluent, pupils are equal, round, and reactive to light, face symmetric, no pronator drip, equal  strength, reflexes are symmetric, normal to light touch with no sensory deficits noted, finger to nose normal, no focal deficits noted.Gait is stable. Able to tandem gait.      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  He had 5 generalized tonic-clonic convulsions, last seizure was June 2018 on levetiracetam 500-500 and lamotrigine 150-150. Lamotrigine level was 4.7. We will optimize lamotrigine and shift levetiracetam to evening reduce fatigue side effects in the morning. We reviewed the side effects, which include,  but are not limited to mayuri asif rash, hepatotoxicity, leukopenia, mood changes, nausea, cognitive changes, and dizziness.  He was agreeable to increasing lamotrigine.     Other antiepileptic drug to consider are: depakote, zonisamide, topiramate, vimpat, fycompa.     Plan:   Follow up  2 months   Nurse education                Medication Name   Tablet Size         8AM  (morning)   8PM (Night)   Notes    Week 1   lamotrigine 100 mg  1.5 tablet    1.5 tablet         lamotrigine 25 mg   1 tablet    0 tablet         levetiracetam 250 mg  2 tablet    2 tablet    levetiracetam pill size changed                  Medication Name   Tablet Size         8AM  (morning)    8PM (Night)   Notes    Week 2   lamotrigine 100 mg  2 tablet    1.5 tablet         lamotrigine 25 mg   0 tablet    0 tablet         levetiracetam 250 mg  2 tablet    2 tablet                     Medication Name   Tablet Size         8AM  (morning)   8PM (Night)   Notes    Week 3-4   lamotrigine 100 mg  2.5 tablet    1.5 tablet         lamotrigine 25 mg   0 tablet    0 tablet         levetiracetam 250 mg  2 tablet    2 tablet                     Medication Name   Tablet Size         8 AM  (morning)   8 PM (Night)   Notes    Week 5--6   lamotrigine 100 mg  3.5 tablet    1 tablet   check antiepileptic drug levels       lamotrigine 25 mg   0 tablet    0 tablet         levetiracetam 250 mg  2 tablet    2 tablet                   Medication Name   Tablet Size         8 AM  (morning)   8 PM (Night)   Notes    Week 7-onward   lamotrigine 100 mg  3.5 tablet    1 tablet   check antiepileptic drug levels       lamotrigine 25 mg   0 tablet    0 tablet         levetiracetam 250 mg  1 tablet    3 tablet  Levetiracetam pill change             [place] regulations on driving were reviewed with you and you should not drive until you are three months seizure/spell free.You are prohibited from operating a motor vehicle within 3 months following any seizure or other episode with sudden unconsciousness or inability to sit up, and that it is required to report most recent seizure to the DMV within 30 days after the event.    Avoid any activities that might lead to self-injury or injury of others, within 3 months following any seizure with impaired awareness or impaired motor control such activities include but are not limited to operating power tools, operating firearms, climbing ladders/trees/exposure to heights from which he might fall. Do not operate power tools or heavy machinery and equipment.  Do not swim alone, bathe in any form of tub, such as bathtub, jacuzzi, or hot tub unless there is a responsible adult close by to provide  assistance in case she has a seizure and drowns. Avoid work on hot surfaces such stoves, ovens, or with scalding hot water.         I spent 40 minutes with the patient. During this time counseling and coordination of care exceeded 50% of the face to face visit time. I addressed all questions the patient raised in regards to their medical care.       Again, thank you for allowing me to participate in the care of your patient.      Sincerely,    Amelia Talbot MD

## 2018-07-11 NOTE — PATIENT INSTRUCTIONS
Medication Name   Tablet Size         8AM  (morning)   8PM (Night)   Notes    Week 1   lamotrigine 100 mg  1.5 tablet    1.5 tablet         lamotrigine 25 mg   1 tablet    0 tablet         levetiracetam 250 mg  2 tablet    2 tablet    levetiracetam pill size changed                  Medication Name   Tablet Size         8AM  (morning)   8PM (Night)   Notes    Week 2   lamotrigine 100 mg  2 tablet    1.5 tablet         lamotrigine 25 mg   0 tablet    0 tablet         levetiracetam 250 mg  2 tablet    2 tablet                     Medication Name   Tablet Size         8AM  (morning)   8PM (Night)   Notes    Week 3-4   lamotrigine 100 mg  2.5 tablet    1.5 tablet         lamotrigine 25 mg   0 tablet    0 tablet         levetiracetam 250 mg  2 tablet    2 tablet                     Medication Name   Tablet Size         8 AM  (morning)   8 PM (Night)   Notes    Week 5--6   lamotrigine 100 mg  3.5 tablet    1 tablet   check antiepileptic drug levels       lamotrigine 25 mg   0 tablet    0 tablet         levetiracetam 250 mg  2 tablet    2 tablet                   Medication Name   Tablet Size         8 AM  (morning)   8 PM (Night)   Notes    Week 7-onward   lamotrigine 100 mg  3.5 tablet    1 tablet   check antiepileptic drug levels       lamotrigine 25 mg   0 tablet    0 tablet         levetiracetam 250 mg  1 tablet    3 tablet  Levetiracetam pill change           Take antiepileptic drug after breakfast to reduce side effects.  If lamotrigine 350 mg am is too much and causes side effects, then change to 300 mg am and 150 mg pm    MRI brain   Nurse education on generalized tonic-clonic convulsion   Follow up  In 6-12 weeks  [place] regulations on driving were reviewed with you and you should not drive until you are three months seizure/spell free.You are prohibited from operating a motor vehicle within 3 months following any seizure or other episode with sudden unconsciousness or inability to sit up, and  that it is required to report most recent seizure to the DMV within 30 days after the event.    Avoid any activities that might lead to self-injury or injury of others, within 3 months following any seizure with impaired awareness or impaired motor control such activities include but are not limited to operating power tools, operating firearms, climbing ladders/trees/exposure to heights from which he might fall. Do not operate power tools or heavy machinery and equipment.  Do not swim alone, bathe in any form of tub, such as bathtub, jacuzzi, or hot tub unless there is a responsible adult close by to provide assistance in case she has a seizure and drowns. Avoid work on hot surfaces such stoves, ovens, or with scalding hot water.         CONTINUE TAKING YOUR OTHER MEDICATIONS AS PREVIOUSLY DIRECTED.  IF YOU  HAVE ANY SIDE EFFECTS OR CONCERNS ABOUT YOUR ANTIEPILEPTIC DRUG CALL [facility] OFFICE [phone]. PLEASE FOLLOW MEDICATION CHANGES AS ADVISED.     This titration schedule was revive wed with patient or caregiver. They expressed understanding of these antiepileptic drug changes.     HALINA DOYLE MD

## 2018-07-18 ENCOUNTER — TRANSFERRED RECORDS (OUTPATIENT)
Dept: HEALTH INFORMATION MANAGEMENT | Facility: CLINIC | Age: 33
End: 2018-07-18

## 2018-07-25 ENCOUNTER — VIRTUAL VISIT (OUTPATIENT)
Dept: NEUROLOGY | Facility: CLINIC | Age: 33
End: 2018-07-25
Payer: COMMERCIAL

## 2018-07-25 DIAGNOSIS — G40.309 GENERALIZED CONVULSIVE EPILEPSY (H): Primary | ICD-10-CM

## 2018-07-25 NOTE — MR AVS SNAPSHOT
After Visit Summary   7/25/2018    Feliberto Perry    MRN: [id number]           Patient Information     Date Of Birth          1985        Visit Information        Provider Department      7/25/2018 3:30 PM 2, [facility] Nurse [facility]        Today's Diagnoses     Generalized convulsive epilepsy (H)    -  1       Follow-ups after your visit        Your next 10 appointments already scheduled     Oct 10, 2018  3:00 PM CDT   Return Visit with MD [facility] ([facility])    [address], [address]  [facility] [address] [phone]              Who to contact     Please call your clinic at [phone] to:    Ask questions about your health    Make or cancel appointments    Discuss your medicines    Learn about your test results    Speak to your doctor            Additional Information About Your Visit        Care EveryWhere ID     This is your Care EveryWhere ID. This could be used by other organizations to access your [facility] medical records  [id number]         Blood Pressure from Last 3 Encounters:   07/11/18 115/65   03/28/18 126/75   05/26/16 101/65    Weight from Last 3 Encounters:   07/11/18 179 lb (81.2 kg)   03/28/18 188 lb 3.2 oz (85.4 kg)   05/26/16 180 lb (81.6 kg)              Today, you had the following     No orders found for display       Primary Care Provider Office Phone # Fax #    Kevin Walden [phone]       [facility] [address]  [facility] [address]        Equal Access to Services     [facility] AH: Hadii aad ku hadasho Soomaali, waaxda luqadaha, qaybta kaalmada adeegyada, waxay idiin hayaan jhoana fuentes la'aan . So [facility] [phone].    ATENCIÓN: Si habla español, tiene a narayan disposición servicios gratuitos de asistencia lingüística. Llame al [phone].    We comply with applicable federal civil rights laws and [place] laws. We do not discriminate on the basis of race,  color, national origin, age, disability, sex, sexual orientation, or gender identity.            Thank you!     Thank you for choosing [facility] E[facility]  for your care. Our goal is always to provide you with excellent care. Hearing back from our patients is one way we can continue to improve our services. Please take a few minutes to complete the written survey that you may receive in the mail after your visit with us. Thank you!             Your Updated Medication List - Protect others around you: Learn how to safely use, store and throw away your medicines at www.disposemymeds.org.          This list is accurate as of 7/25/18 11:59 PM.  Always use your most recent med list.                   Brand Name Dispense Instructions for use Diagnosis    cyclobenzaprine 10 MG tablet    FLEXERIL     Take 5-10 mg by mouth    Generalized convulsive epilepsy (H)       ibuprofen 800 MG tablet    ADVIL/MOTRIN     Take 800 mg by mouth    Generalized convulsive epilepsy (H)       * lamoTRIgine 25 MG tablet    LaMICtal    250 tablet    Titrate to 150 mg twice a day    Generalized convulsive epilepsy (H)       * lamoTRIgine 100 MG tablet    LaMICtal    405 tablet    Titrate to 350 mg am and 150 mg pm    Generalized convulsive epilepsy (H)       levETIRAcetam 250 MG tablet    KEPPRA    360 tablet    500 mg twice a day, then change to 250 mg am and 750 mg pm    Generalized convulsive epilepsy (H)       * Notice:  This list has 2 medication(s) that are the same as other medications prescribed for you. Read the directions carefully, and ask your doctor or other care provider to review them with you.

## 2018-07-26 NOTE — PROCEDURES
Two week follow up phone call per Dr. Talbot regarding recent medication changes:    Feliberto Vicky denies any recent seizures. Denies rash, mood changes or other concerns. He confirms that he is currently taking Lamotrigine (150) 200-150 HS in addition to LEV (250) 500-500. He plans to further increase Lamotrigine beginning week 3 to 250-150. Levetiracetam will remain 500 BID. Feliberto did not have any additional questions/concerns.

## 2018-09-04 ENCOUNTER — TELEPHONE (OUTPATIENT)
Dept: NEUROLOGY | Facility: CLINIC | Age: 33
End: 2018-09-04

## 2018-09-04 NOTE — TELEPHONE ENCOUNTER
----- Message from Flores Sánchez sent at 9/4/2018 11:00 AM CDT -----  Regarding: jessica  Caller: Feliberto     Relationship to Patient: pt     Call Back Number: [phone]    Reason for Call: Pt would like a call back to talk about lab results.

## 2018-09-04 NOTE — TELEPHONE ENCOUNTER
Patient calls today to review medication titration schedule and recent lab results from outside facility.   He reports his lamotrigine level at 6.0 and levetiracetam level at 6.4. He questions why the lev level is lower than usual. (goal is around 13 per patient).   Current dose is  lamotrigine (week 7 starts today) 3.5 tablets in the morning (350 mg) and 1 tablet at night (100 mg); levetiracetam 250-750.     No side effects.   No seizures this interval.       PLAN:  Discuss with MD if further increases are necessary. Next office visit it 10/10/18.       ADDENDUM:     Chart reviewed with thomas Macias MD. She advises the patient to continue his current schedule of week 7 and return to clinic as previously planned on October 10th with Dr. Talbot. This was communicated to the patient and he is agreeable to this plan.

## 2018-09-07 ENCOUNTER — TRANSFERRED RECORDS (OUTPATIENT)
Dept: HEALTH INFORMATION MANAGEMENT | Facility: CLINIC | Age: 33
End: 2018-09-07

## 2018-09-07 LAB
KEPPRA (LEVETIRACETAM) LEVEL: 6.4 MCG/ML (ref 12–46)
LAMOTRIGINE SERPL-MCNC: 6 MCG/ML (ref 2.5–15)

## 2018-09-12 LAB
LAMOTRIGINE SERPL-MCNC: 6.3 MCG/ML (ref 2.5–15)
LEVETIRACETAM SERPL-MCNC: 4.5 MCG/ML (ref 12–46)

## 2018-10-04 ENCOUNTER — TRANSFERRED RECORDS (OUTPATIENT)
Dept: HEALTH INFORMATION MANAGEMENT | Facility: CLINIC | Age: 33
End: 2018-10-04

## 2018-10-05 DIAGNOSIS — G40.309 GENERALIZED CONVULSIVE EPILEPSY (H): ICD-10-CM

## 2018-10-05 LAB
ALBUMIN SERPL-MCNC: 4.5 G/DL (ref 3.5–5)
ALP SERPL-CCNC: 68 U/L (ref 40–150)
ALT SERPL-CCNC: 21 U/L (ref 0–55)
ANION GAP SERPL CALCULATED.3IONS-SCNC: 12.4 MMOL/L
AST SERPL-CCNC: 31 U/L (ref 5–34)
BILIRUB SERPL-MCNC: 0.7 MG/DL (ref 0.2–1.2)
BUN SERPL-MCNC: 18 MG/DL (ref 7–26)
BUN/CREATININE RATIO: 18
CALCIUM SERPL-MCNC: 10.2 MG/DL (ref 8.4–10.2)
CHLORIDE SERPLBLD-SCNC: 107 MMOL/L (ref 98–107)
CO2 SERPL-SCNC: 29 MMOL/L (ref 20–31)
CREAT SERPL-MCNC: 1 MG/DL (ref 0.73–1.18)
GFR SERPL CREATININE-BSD FRML MDRD: 85 ML/MIN/1.73M2
GLOBULIN: 3 G/DL
GLUCOSE SERPL-MCNC: 85 MG/DL (ref 70–105)
LAMOTRIGINE SERPL-MCNC: 8.4 MCG/ML (ref 2.5–15)
LEVETIRACETAM SERPL-MCNC: 9.1 MCG/ML (ref 12–46)
POTASSIUM SERPL-SCNC: 5.4 MMOL/L (ref 3.5–5.1)
PROT SERPL-MCNC: 7.5 G/DL (ref 6.4–8.3)
SODIUM SERPL-SCNC: 143 MMOL/L (ref 136–145)

## 2018-10-09 DIAGNOSIS — G40.309 GENERALIZED CONVULSIVE EPILEPSY (H): ICD-10-CM

## 2018-10-10 ENCOUNTER — OFFICE VISIT (OUTPATIENT)
Dept: NEUROLOGY | Facility: CLINIC | Age: 33
End: 2018-10-10
Payer: COMMERCIAL

## 2018-10-10 VITALS
TEMPERATURE: 98.6 F | BODY MASS INDEX: 24.6 KG/M2 | HEIGHT: 72 IN | DIASTOLIC BLOOD PRESSURE: 64 MMHG | WEIGHT: 181.6 LBS | SYSTOLIC BLOOD PRESSURE: 118 MMHG

## 2018-10-10 DIAGNOSIS — G40.309 GENERALIZED CONVULSIVE EPILEPSY (H): ICD-10-CM

## 2018-10-10 RX ORDER — LAMOTRIGINE 100 MG/1
TABLET ORAL
Qty: 405 TABLET | Refills: 3 | Status: SHIPPED | OUTPATIENT
Start: 2018-10-10

## 2018-10-10 RX ORDER — LEVETIRACETAM 250 MG/1
TABLET ORAL
Qty: 360 TABLET | Refills: 3 | Status: SHIPPED | OUTPATIENT
Start: 2018-10-10 | End: 2019-05-22

## 2018-10-10 ASSESSMENT — PAIN SCALES - GENERAL: PAINLEVEL: NO PAIN (0)

## 2018-10-10 NOTE — PROGRESS NOTES
[facility]    Patient:  Feliberto Perry  :  1985   Age:  33 year old   Today's Office Visit:  10/10/2018    Epilepsy Data:            The patient's first seizure occurred when he was 10 years old.  Second seizure at age 27, and third at age 33. He had a 4th seizure 2018, he missed a one antiepileptic drug dose. The had his 5th seizure 2018,  He has had 2 types of seizures:  Type 1:  GTC seizures: His first seizure was a grand mal seizure.  He was told that during GTC seizures, he would have a blank stare and then his eyes would roll back, and he would making noises like grunting sounds.  He would fall to the floor and shake all over, sometimes drool, sometimes bite his tongue.  Post-ictally, he is very tired and have sore muscles and would need to sleep.  He has had these very rarely; the last one was five years ago.  Type 2 are staring spells which subsided around age 16 or 18.  He tapered off carbamazepine in late 20s.  However, Dr. Walden's note states he was off carbamazepine around .  He took Tegretol when he was young, he started taking carbamazepine at age 10. He took carbamazepine from age [age]. He had no side effects from Tegretol, he was tired. He denies myoclonic jerking or other type of seizures.  He states that whenever he had a grand mal seizure, he was usually up late and drinking.  Excessive tiredness also triggers his seizures. He started levetiracetam 2018 and he is taking 500 mg twice a day. He denies history of meningitis, encephalitis, febrile seizures, family history of epilepsy, significant head injury, known tumor or stroke.     Interval History: This is my 2nd visit with Feliberto. His wife, Rocio, is with him. Last seizure was 2018, missed a one antiepileptic drug dose. 5th seizure 2018 (no trigger). On his last visit we made some medication changes and he is tolerating this well. He has more energy in the am.  On lamotrigine no  major side effects. Most seizure happen at 8:30am. Currently, on antiepileptic drugs there is fatigue, no dizziness, no double vision , no mood changes (feelings of depression, irritablity, more argumentative), not sleeping more during the day, no GI issues  , no rash  or no worsening anxeity . Sometimes his vision is out of focus, he is on a high dose of lamotrigine in the morning. I recommended he eat breakfast prior to taking lamotrigine. On this visit we spoke about patient's seizures, antiepileptic drug, and plan of epilepsy are. Patient/caregiver was agreeable with plan of care.     Prior to Admission medications    Medication Sig Start Date End Date Taking? Authorizing Provider   lamoTRIgine (LAMICTAL) 100 MG tablet Titrate to 350 mg am and 150 mg pm 7/11/18  Yes Amelia Talbot MD   levETIRAcetam (KEPPRA) 250 MG tablet 500 mg twice a day, then change to 250 mg am and 750 mg pm 7/11/18  Yes Amelia Talbot MD   cyclobenzaprine (FLEXERIL) 10 MG tablet Take 5-10 mg by mouth 2/26/18 2/26/19  Reported, Patient   ibuprofen (ADVIL/MOTRIN) 800 MG tablet Take 800 mg by mouth 2/26/18   Reported, Patient         MEDICATIONS:  Levetiracetam 250 mg am and 500 mg pm and lamotrigine 350 mg am and 100 mg pm      Past antiepileptic drug:   He has been off Tegretol since 2014. He has not tried any other AEDs.      ALLERGIES TO MEDICATIONS:  Ancef.      PAST MEDICAL HISTORY:  Epilepsy.  Otherwise unremarkable.      SOCIAL AND EDUCATIONAL HISTORY:  He attended regular classes.  He did not need special education.  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  He is  and has two children.  He used to drink heavily, but has now cut back on his drinking, and maybe drinks once every few months, maximum of two beers.  Denies smoking or illicit drugs.      REVIEW OF SYSTEMS:  Complete review of system was done and was  positive for nosebleed and eye floaters.      EXAMINATION /64 (BP Location: Left arm, Patient Position: Chair, Cuff Size: Adult Regular)  Temp 98.6  F (37  C) (Temporal)  Ht 6' (182.9 cm)  Wt 181 lb 9.6 oz (82.4 kg)  BMI 24.63 kg/m2  Alert, orientated, speech is fluent, pupils are equal, round, and reactive to light, face symmetric, no pronator drip, equal  strength, reflexes are symmetric, normal to light touch with no sensory deficits noted, finger to nose normal, no focal deficits noted.Gait is stable. Able to tandem gait.      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  He had 5 generalized tonic-clonic convulsions, last seizure was June 2018 on levetiracetam 500-500 and lamotrigine 150-150. Lamotrigine level was 4.7. We optimized morning dose of lamotrigine and shifted levetiracetam to evening reduce fatigue side effects in the morning. This has been effective. In the morning his vision is out of focus, he is on a high dose of lamotrigine in the morning. I recommended he eat breakfast prior to taking lamotrigine.  If he has another seizure we can increase levetiracetam or lamotrigine. If needed, other antiepileptic drug to consider are: depakote, zonisamide, topiramate, vimpat, fycompa.     Plan:   1. Continue same antiepileptic drug.                    Medication Name   Tablet Size         8 AM  (morning)   8 PM (Night)   Notes      lamotrigine 100 mg  3.5 tablet    1 tablet        levetiracetam 250 mg  1 tablet    3 tablet         2. Follow up  1 year   3. Sent script for antiepileptic drug and lab orders     I spent 35 minutes with the patient. During this time counseling and coordination of care exceeded 50% of the face to face visit time. I addressed all questions the patient raised in regards to their medical care.       Amelia Talbot MD

## 2018-10-10 NOTE — MR AVS SNAPSHOT
After Visit Summary   10/10/2018    Feliberto Perry    MRN: [id number]           Patient Information     Date Of Birth          1985        Visit Information        Provider Department      10/10/2018 3:00 PM Amelia Talbot MD [facility]        Today's Diagnoses     Generalized convulsive epilepsy (H)           Follow-ups after your visit        Follow-up notes from your care team     Return in about 11 months (around 9/10/2019).      Who to contact     Please call your clinic at [phone] to:    Ask questions about your health    Make or cancel appointments    Discuss your medicines    Learn about your test results    Speak to your doctor            Additional Information About Your Visit        MyChart Information     [email or web address] is an electronic gateway that provides easy, online access to your medical records. With [employer], you can request a clinic appointment, read your test results, renew a prescription or communicate with your care team.     To sign up for [email or web address] visit the website at [email or web address]   You will be asked to enter the access code listed below, as well as some personal information. Please follow the directions to create your username and password.     Your access code is: [email or web address]  Expires: 2019  3:28 PM     Your access code will  in 90 days. If you need help or a new code, please contact your [facility] or call [phone] for assistance.        Care EveryWhere ID     This is your Care EveryWhere ID. This could be used by other organizations to access your [facility] medical records  [id number]        Your Vitals Were     Temperature Height BMI (Body Mass Index)             98.6  F (37  C) (Temporal) 6' (182.9 cm) 24.63 kg/m2          Blood Pressure from Last 3 Encounters:   10/10/18 118/64   18 115/65   18 126/75    Weight from Last 3 Encounters:   10/10/18 181 lb 9.6 oz (82.4 kg)   18 179  lb (81.2 kg)   03/28/18 188 lb 3.2 oz (85.4 kg)              Today, you had the following     No orders found for display         Today's Medication Changes          These changes are accurate as of 10/10/18  3:28 PM.  If you have any questions, ask your nurse or doctor.               These medicines have changed or have updated prescriptions.        Dose/Directions    lamoTRIgine 100 MG tablet   Commonly known as:  LaMICtal   This may have changed:  Another medication with the same name was removed. Continue taking this medication, and follow the directions you see here.   Used for:  Generalized convulsive epilepsy (H)   Changed by:  Amelia Talbot MD        Titrate to 350 mg am and 150 mg pm   Quantity:  405 tablet   Refills:  3                Primary Care Provider Office Phone # Fax #    Kevin Walden [phone]       [facility] [address]  [facility] [address]        Equal Access to Services     [facility]: Hadii john jacques hadasho Sotrudyali, waaxda luqadaha, qaybta kaalmada adeegyada, mela hughes . So [facility] [phone].    ATENCIÓN: Si habla español, tiene a narayan disposición servicios gratuitos de asistencia lingüística. Llame al [phone].    We comply with applicable federal civil rights laws and [place] laws. We do not discriminate on the basis of race, color, national origin, age, disability, sex, sexual orientation, or gender identity.            Thank you!     Thank you for choosing [facility]  for your care. Our goal is always to provide you with excellent care. Hearing back from our patients is one way we can continue to improve our services. Please take a few minutes to complete the written survey that you may receive in the mail after your visit with us. Thank you!             Your Updated Medication List - Protect others around you: Learn how to safely use, store and throw away your medicines at www.disposemymeds.org.           This list is accurate as of 10/10/18  3:28 PM.  Always use your most recent med list.                   Brand Name Dispense Instructions for use Diagnosis    cyclobenzaprine 10 MG tablet    FLEXERIL     Take 5-10 mg by mouth    Generalized convulsive epilepsy (H)       ibuprofen 800 MG tablet    ADVIL/MOTRIN     Take 800 mg by mouth    Generalized convulsive epilepsy (H)       lamoTRIgine 100 MG tablet    LaMICtal    405 tablet    Titrate to 350 mg am and 150 mg pm    Generalized convulsive epilepsy (H)       levETIRAcetam 250 MG tablet    KEPPRA    360 tablet    500 mg twice a day, then change to 250 mg am and 750 mg pm    Generalized convulsive epilepsy (H)

## 2018-12-15 NOTE — LETTER
10/10/2018       RE: Feliberto Perry  : 1985   MRN: [id number]      Dear Colleague,    Thank you for referring your patient, Feliberto Perry, to the [facility] CARE at [facility]. Please see a copy of my visit note below.    [facility]    Patient:  Feliberto Perry  :  1985   Age:  33 year old   Today's Office Visit:  10/10/2018    Epilepsy Data:            The patient's first seizure occurred when he was 10 years old.  Second seizure at age 27, and third at age 33. He had a 4th seizure 2018, he missed a one antiepileptic drug dose. The had his 5th seizure 2018,  He has had 2 types of seizures:  Type 1:  GTC seizures: His first seizure was a grand mal seizure.  He was told that during GTC seizures, he would have a blank stare and then his eyes would roll back, and he would making noises like grunting sounds.  He would fall to the floor and shake all over, sometimes drool, sometimes bite his tongue.  Post-ictally, he is very tired and have sore muscles and would need to sleep.  He has had these very rarely; the last one was five years ago.  Type 2 are staring spells which subsided around age 16 or 18.  He tapered off carbamazepine in late 20s.  However, Dr. Walden's note states he was off carbamazepine around .  He took Tegretol when he was young, he started taking carbamazepine at age 10. He took carbamazepine from age [age]. He had no side effects from Tegretol, he was tired. He denies myoclonic jerking or other type of seizures.  He states that whenever he had a grand mal seizure, he was usually up late and drinking.  Excessive tiredness also triggers his seizures. He started levetiracetam 2018 and he is taking 500 mg twice a day. He denies history of meningitis, encephalitis, febrile seizures, family history of epilepsy, significant head injury, known tumor or stroke.     Interval History: This is my 2nd visit  Bipolar disorder    Drug abuse    Hypertension    Hypothyroid    Incontinence    Schizophrenia with Feliberto. His wife, Rocio, is with him. Last seizure was 4/2018, missed a one antiepileptic drug dose. 5th seizure June 2018 (no trigger). On his last visit we made some medication changes and he is tolerating this well. He has more energy in the am.  On lamotrigine no major side effects. Most seizure happen at 8:30am. Currently, on antiepileptic drugs there is fatigue, no dizziness, no double vision , no mood changes (feelings of depression, irritablity, more argumentative), not sleeping more during the day, no GI issues  , no rash  or no worsening anxeity . Sometimes his vision is out of focus, he is on a high dose of lamotrigine in the morning. I recommended he eat breakfast prior to taking lamotrigine. On this visit we spoke about patient's seizures, antiepileptic drug, and plan of epilepsy are. Patient/caregiver was agreeable with plan of care.     Prior to Admission medications    Medication Sig Start Date End Date Taking? Authorizing Provider   lamoTRIgine (LAMICTAL) 100 MG tablet Titrate to 350 mg am and 150 mg pm 7/11/18  Yes Amelia Talbot MD   levETIRAcetam (KEPPRA) 250 MG tablet 500 mg twice a day, then change to 250 mg am and 750 mg pm 7/11/18  Yes Amelia Talbot MD   cyclobenzaprine (FLEXERIL) 10 MG tablet Take 5-10 mg by mouth 2/26/18 2/26/19  Reported, Patient   ibuprofen (ADVIL/MOTRIN) 800 MG tablet Take 800 mg by mouth 2/26/18   Reported, Patient         MEDICATIONS:  Levetiracetam 250 mg am and 500 mg pm and lamotrigine 350 mg am and 100 mg pm      Past antiepileptic drug:   He has been off Tegretol since 2014. He has not tried any other AEDs.      ALLERGIES TO MEDICATIONS:  Ancef.      PAST MEDICAL HISTORY:  Epilepsy.  Otherwise unremarkable.      SOCIAL AND EDUCATIONAL HISTORY:  He attended regular classes.  He did not need special education.  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  He is  and has two children.  He used to drink heavily, but has now cut back on his drinking, and maybe drinks once every few months, maximum of two beers.  Denies smoking or illicit drugs.      REVIEW OF SYSTEMS:  Complete review of system was done and was positive for nosebleed and eye floaters.      EXAMINATION /64 (BP Location: Left arm, Patient Position: Chair, Cuff Size: Adult Regular)  Temp 98.6  F (37  C) (Temporal)  Ht 6' (182.9 cm)  Wt 181 lb 9.6 oz (82.4 kg)  BMI 24.63 kg/m2  Alert, orientated, speech is fluent, pupils are equal, round, and reactive to light, face symmetric, no pronator drip, equal  strength, reflexes are symmetric, normal to light touch with no sensory deficits noted, finger to nose normal, no focal deficits noted.Gait is stable. Able to tandem gait.      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  He had 5 generalized tonic-clonic convulsions, last seizure was June 2018 on levetiracetam 500-500 and lamotrigine 150-150. Lamotrigine level was 4.7. We optimized morning dose of lamotrigine and shifted levetiracetam to evening reduce fatigue side effects in the morning. This has been effective. In the morning his vision is out of focus, he is on a high dose of lamotrigine in the morning. I recommended he eat breakfast prior to taking lamotrigine.  If he has another seizure we can increase levetiracetam or lamotrigine. If needed, other antiepileptic drug to consider are: depakote, zonisamide, topiramate, vimpat, fycompa.     Plan:   1. Continue same antiepileptic drug.                    Medication Name   Tablet Size         8 AM  (morning)   8 PM (Night)   Notes      lamotrigine 100 mg  3.5 tablet    1 tablet        levetiracetam 250 mg  1 tablet    3 tablet         2. Follow up  1 year   3. Sent script for  antiepileptic drug and lab orders     I spent 35 minutes with the patient. During this time counseling and coordination of care exceeded 50% of the face to face visit time. I addressed all questions the patient raised in regards to their medical care.       Amelia Talbot MD

## 2019-01-29 DIAGNOSIS — G40.309 GENERALIZED CONVULSIVE EPILEPSY (H): ICD-10-CM

## 2019-01-31 RX ORDER — LAMOTRIGINE 25 MG/1
TABLET ORAL
Qty: 250 TABLET | Refills: 3 | OUTPATIENT
Start: 2019-01-31

## 2019-05-09 ENCOUNTER — TELEPHONE (OUTPATIENT)
Dept: NEUROLOGY | Facility: CLINIC | Age: 34
End: 2019-05-09

## 2019-05-09 DIAGNOSIS — G40.309 GENERALIZED CONVULSIVE EPILEPSY (H): Primary | ICD-10-CM

## 2019-05-09 NOTE — TELEPHONE ENCOUNTER
Patient had a seizure on Wednesday. He would like to check his medication levels.    Seizure occurred right before his morning dose 5/8/19. He made a loud groan followed by whole body shaking and drooling followed by one hour confusion and then lethargy. He actually slept for the rest of the day. It lasted 1-2 minutes which is shorter than usual. No injuries. He is back to baseline today. He did take his AEDs when he came to yesterday morning so no missed doses with this.     Patient has had no missed doses of medications, no changes to any medications. No changes to lifestyle, no change in caffeine, alcohol, illness. He is working long days, was very tired on Tuesday before the seizure. 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      Current medication doses:  -100  -750.    [facility] Attn Mary [phone]    I advised Mary to encourage Feliberto to practice really good self care right now; eat well, drink plenty of water, go to bed at the same time every night and get enough sleep, and to find a way to manage stress well. She is agreeable. Orders faxed to [facility].

## 2019-05-09 NOTE — TELEPHONE ENCOUNTER
[facility] Call Center    Phone Message    May a detailed message be left on voicemail: yes    Reason for Call: Order(s): Other:   Reason for requested: Wants to check lab levels for Lamotrigine and Keppra.   Date needed: [id number]  Provider name: Antione      Action Taken: Other: [facility] RN Pool

## 2019-05-20 ENCOUNTER — TRANSFERRED RECORDS (OUTPATIENT)
Dept: HEALTH INFORMATION MANAGEMENT | Facility: CLINIC | Age: 34
End: 2019-05-20

## 2019-05-21 ENCOUNTER — TELEPHONE (OUTPATIENT)
Dept: NEUROLOGY | Facility: CLINIC | Age: 34
End: 2019-05-21

## 2019-05-21 DIAGNOSIS — G40.309 GENERALIZED CONVULSIVE EPILEPSY (H): ICD-10-CM

## 2019-05-21 NOTE — TELEPHONE ENCOUNTER
What is the concern that needs to be addressed by a nurse? Patient would like to discuss recent blood levels.    May a detailed message be left on voicemail? Yes    Date of last office visit: 10/10/18     Message routed to: [facility] RN Pool

## 2019-05-21 NOTE — TELEPHONE ENCOUNTER
[facility] will be faxing results over today.    Garret Rojas MA on 5/21/2019 at 2:32 PM

## 2019-05-22 RX ORDER — LEVETIRACETAM 250 MG/1
TABLET ORAL
Qty: 360 TABLET | Refills: 3
Start: 2019-05-22 | End: 2019-05-23

## 2019-05-22 NOTE — TELEPHONE ENCOUNTER
Call returned to patient to confirm telephone appointment with Dr. Talbot tomorrow. Patient was advised not to drive per state law, to work around water only in the presence of another adult whom could recognize and respond to seizures. Patient states he understands, that he is a , his wife works at the clinic and his dad is a , thus he feels he has the right people around him to support him.     Patient had no further questions.

## 2019-05-22 NOTE — TELEPHONE ENCOUNTER
Labs resulted. Viewable through [employer]. LTG 6.1, LEV 6.2. Note these levels were drawn at 0630 before his morning doses. His previous levels last October were drawn at 0830 after am dose. Lab reviewed with Dr. Marino. Verbal orders received to increase LEV to 500-750. I notified the patient of this plan and asked him to schedule a return visit with Dr. Talbot. He refuses to come to clinic to see Dr. Talbot at this time. He requests a telephone visit instead. He states he is also feeling fatigued and asks if this is due to his low AED levels. I told him that this is not likely. Unfortunately, his call disconnected while on hold waiting for his telephone appointment to be scheduled.

## 2019-05-23 ENCOUNTER — VIRTUAL VISIT (OUTPATIENT)
Dept: NEUROLOGY | Facility: CLINIC | Age: 34
End: 2019-05-23
Payer: COMMERCIAL

## 2019-05-23 DIAGNOSIS — G40.309 GENERALIZED CONVULSIVE EPILEPSY (H): Primary | ICD-10-CM

## 2019-05-23 RX ORDER — LEVETIRACETAM 250 MG/1
TABLET ORAL
Qty: 400 TABLET | Refills: 3 | Status: SHIPPED | OUTPATIENT
Start: 2019-05-23 | End: 2020-07-16

## 2019-05-23 NOTE — PROGRESS NOTES
[facility] Telephone Call Note      Continue same antiepileptic drug.     5/8/2019 and 12/2018 he had a generalized tonic-clonic convulsion. He had his seizure early morning 7-9 am. Patient states antiepileptic drug were not missed, no active infection, no sleep deprivation, no excessive alcohol use or recreational drug use, no obvious trigger for seizure.     Current medication doses:  -100  -750.    Plan:   After 1 week increase levetiracetam   -100  -1000.      I spent 10 minutes with the patient. During this time counseling and coordination of care exceeded 50% of the face to face visit time. I addressed all questions the patient raised in regards to their medical care.     Fax lab order to clinic: [phone]      Amelia Talbot MD

## 2019-06-14 ENCOUNTER — TELEPHONE (OUTPATIENT)
Dept: NEUROLOGY | Facility: CLINIC | Age: 34
End: 2019-06-14

## 2019-06-14 NOTE — TELEPHONE ENCOUNTER
[facility] were redrawn on 6/4/19. LEV 8.2, LTG 5.9. Tired and drowsy; same feeling he had many years ago. Current doses are -1000, -100. No seizures since last reported.     I will ask Dr. Talbot if an increase or change in medication is indicated.

## 2019-06-14 NOTE — TELEPHONE ENCOUNTER
What is the concern that needs to be addressed by a nurse? Patient would like a call back regarding his test results.    May a detailed message be left on voicemail? yes    Date of last office visit: 05/23/2019    Message routed to:[facility] RN POOL.

## 2019-06-19 NOTE — TELEPHONE ENCOUNTER
PLAN: Discussed with Dr. Talbot    1) No changes to medication -continue medications as prescribed (-1000 / -100).  2) Call if seizures occur.

## 2019-08-17 ENCOUNTER — TELEPHONE (OUTPATIENT)
Dept: NEUROLOGY | Facility: CLINIC | Age: 34
End: 2019-08-17

## 2019-08-17 NOTE — TELEPHONE ENCOUNTER
Call received to clarify medication dose.  -1000 / -100 per 5/23/19 and 6/14/19 notes.  Patient reports that he had been taking -1000 per prescription written on bottle (prescribed 5/23/19, written for -1000).  Has been taking -100, consistent with prescription and notes.  Patient would like to clarify exact dose of AEDs.    Patient denies side effects from LEV at this time. Had been taking -1000 since he received his new prescription 5/2019.  Encouraged patient to continue current dose given lack of side effects, will inbox Dr. Talbot re: clarification of dose.

## 2019-08-28 NOTE — TELEPHONE ENCOUNTER
Placed call to patient to confirm correct doses are being taken.  Patient was out at the time I called, but spoke with his wife who wrote down current scripts as per chart review.  Confirmed with her that Keppra doses are:  500 - 1000 (250 mg tabs).  She confirms that patient has not been having any adverse effects.

## 2020-05-04 DIAGNOSIS — G40.309 GENERALIZED CONVULSIVE EPILEPSY (H): ICD-10-CM

## 2020-05-06 RX ORDER — LEVETIRACETAM 250 MG/1
TABLET ORAL
Qty: 540 TABLET | Refills: 0 | OUTPATIENT
Start: 2020-05-06

## 2020-05-06 NOTE — TELEPHONE ENCOUNTER
LEVETIRACETAM 250MG TABLET       Last Written Prescription Date:  5-23-19  Last Fill Quantity: 400,   # refills: 3  Last Office Visit : 5-23-19  Future Office visit:  none    Dosage clarification: tele note 8-12-19    Outside lab: 10-29-18  CREATININE  0.73 - 1.18 mg/dL  0.90     2 cancelled appt:  [id number] Not Needed (Cancelled per Rocio - going elsewhere)  [id number] (Patient is seeing a different doc.)      Routing refill request to clinic RN for review/approval because:  ? If pt has established care elsewhere,  (No notes in Care Everywhere/Media)

## 2020-07-13 DIAGNOSIS — G40.309 GENERALIZED CONVULSIVE EPILEPSY (H): ICD-10-CM

## 2020-07-16 RX ORDER — LEVETIRACETAM 250 MG/1
TABLET ORAL
Qty: 180 TABLET | Refills: 0 | Status: SHIPPED | OUTPATIENT
Start: 2020-07-16

## 2020-07-16 NOTE — TELEPHONE ENCOUNTER
levETIRAcetam (KEPPRA) 250 MG tablet   Last Written Prescription Date:  5/23/2019  Last Fill Quantity: 400,   # refills: 3  Last Office Visit : 5/23/2019  Future Office visit:  None    Routing refill request to provider for review/approval because:  Continue same dose?    Follow up office visit?  Refer to Provider for review and refill per Provider Recommendations for Pt care.    Amanda Fernandez RN  Central Triage Red Flags/Med Refills